# Patient Record
Sex: FEMALE | URBAN - METROPOLITAN AREA
[De-identification: names, ages, dates, MRNs, and addresses within clinical notes are randomized per-mention and may not be internally consistent; named-entity substitution may affect disease eponyms.]

---

## 2018-04-19 ENCOUNTER — APPOINTMENT (RX ONLY)
Dept: URBAN - METROPOLITAN AREA CLINIC 39 | Facility: CLINIC | Age: 48
Setting detail: DERMATOLOGY
End: 2018-04-19

## 2018-04-19 DIAGNOSIS — L75.1 CHROMHIDROSIS: ICD-10-CM

## 2018-04-19 PROBLEM — L75.0 BROMHIDROSIS: Status: ACTIVE | Noted: 2018-04-19

## 2018-04-19 PROCEDURE — 99202 OFFICE O/P NEW SF 15 MIN: CPT

## 2018-04-19 RX ORDER — CLINDAMYCIN PHOSPHATE 10 MG/ML
LOTION TOPICAL
Qty: 1 | Refills: 1 | Status: ERX | COMMUNITY
Start: 2018-04-19

## 2018-04-19 RX ADMIN — CLINDAMYCIN PHOSPHATE: 10 LOTION TOPICAL at 13:37

## 2024-12-24 NOTE — HPI: FREE FORM (INITIAL HISTORY)
Bedside and Verbal shift change report given to Shelly (oncoming nurse) by Nancy (offgoing nurse). Report included the following information Nurse Handoff Report, ED Encounter Summary, ED SBAR, Adult Overview, Intake/Output, MAR, Recent Results, and Neuro Assessment, outstanding orders to be completed. Opportunity for questions and clarification was provided.     
How Severe Is Your Skin Condition? (The Patient Describes The Severity Level As....): moderate
Patient discharged from the ED by MD Ramachandran. Diagnosis, medications, precautions and follow-ups were reviewed with the patient/family. Questions were asked and answered prior to departure. Patient departed the ED via ambulatory and was accompanied by daughter.   
Report received from FABIOLA Cruz. Reviewed reason for patient arrival, vitals, labs, medications, orders, procedures, results, pending orders/results and current plan for disposition. Questions were asked and answered prior to departure.    
What Brings You In Today? (This Is An Xx Year Old Patient Who Presents For...): Discolored perspiration
When Did You First Notice It? (The Patient First Noticed It...): September 2017
Where On Your Body Is It? (Located On...): On the body throughout
Any Associated Symptoms? (The Symptoms Include.....): \\n